# Patient Record
Sex: FEMALE | Race: WHITE | Employment: FULL TIME | URBAN - METROPOLITAN AREA
[De-identification: names, ages, dates, MRNs, and addresses within clinical notes are randomized per-mention and may not be internally consistent; named-entity substitution may affect disease eponyms.]

---

## 2021-01-26 ENCOUNTER — OFFICE VISIT (OUTPATIENT)
Dept: HEMATOLOGY | Age: 31
End: 2021-01-26
Payer: COMMERCIAL

## 2021-01-26 VITALS
TEMPERATURE: 97.3 F | HEIGHT: 65 IN | BODY MASS INDEX: 29.82 KG/M2 | HEART RATE: 78 BPM | OXYGEN SATURATION: 98 % | RESPIRATION RATE: 16 BRPM | SYSTOLIC BLOOD PRESSURE: 112 MMHG | WEIGHT: 179 LBS | DIASTOLIC BLOOD PRESSURE: 62 MMHG

## 2021-01-26 DIAGNOSIS — D13.4 HEPATIC ADENOMA: Primary | ICD-10-CM

## 2021-01-26 PROBLEM — Z90.49 STATUS POST LAPAROSCOPIC CHOLECYSTECTOMY: Status: ACTIVE | Noted: 2021-01-26

## 2021-01-26 PROBLEM — K76.9 HEPATIC LESION: Status: ACTIVE | Noted: 2021-01-26

## 2021-01-26 PROBLEM — E53.8 VITAMIN B12 DEFICIENCY: Status: ACTIVE | Noted: 2021-01-26

## 2021-01-26 PROBLEM — Z3A.12 12 WEEKS GESTATION OF PREGNANCY: Status: ACTIVE | Noted: 2021-01-26

## 2021-01-26 PROCEDURE — 99204 OFFICE O/P NEW MOD 45 MIN: CPT | Performed by: HOSPITALIST

## 2021-01-26 RX ORDER — CHOLECALCIFEROL TAB 125 MCG (5000 UNIT) 125 MCG
TAB ORAL DAILY
COMMUNITY

## 2021-01-26 RX ORDER — CYANOCOBALAMIN 1000 UG/ML
1000 INJECTION, SOLUTION INTRAMUSCULAR; SUBCUTANEOUS ONCE
COMMUNITY

## 2021-01-26 NOTE — PROGRESS NOTES
[address] [facility]      Blanca Serra MD, Jai Fleming, Ana Lora MD, MPH      Nahomy Morrison, JOHAN Angeles, [facility]-BC     Alyse Marin, [facility]   María Layton, FNP-SUN Justice, [facility]       Jan[facility] 136    at [address], [address] [place] [address], [facility] 22.    [phone]    FAX: [address] [place] [address]    [address], [address]    [facility], [address]    [phone]    FAX: [phone]     Patient Care Team:  Zeus Patel MD as PCP - General (Family Medicine)    Problem List  Date Reviewed: 1/26/2021          Codes Class Noted    Hepatic lesion ICD-10-CM: K76.9  ICD-9-CM: 573.8  1/26/2021        12 weeks gestation of pregnancy ICD-10-CM: Z3A.12  ICD-9-CM: V22.2  1/26/2021        Vitamin B12 deficiency ICD-10-CM: E53.8  ICD-9-CM: 266.2  1/26/2021        Status post laparoscopic cholecystectomy ICD-10-CM: Z90.49  ICD-9-CM: V45.89  1/26/2021            The clinicians listed above have asked me to see Rosea Mass in consultation regarding a liver mass. All medical records sent by the referring physicians were reviewed including imaging studies      The patient is a 26 y.o.  female without any history of previous liver disease. The patient underwent an ultrasound of the liver in 03/2020. This demonstrated normal liver but showed focal region of incrased echogenicity in right hepatic lobe measuring 3.2 x 1.9 x 1.8 cm suggestive of focal fat infiltration. Subsequent CT scan performed in 06/2020 showed a 1.6 x 1.3 cm hypodense lesion. MRI of the abdomen also performed in 06/2020 showed a 1.7 x 1.3 cm lesion in the anterior aspect of medial left hepatic lobe suggestive of adenoma.     Patient was on OCP for 14 to 15 years but she stopped in 08/2020 due to diagnosis of adenoma        A biopsy of the liver mass was not performed      The patient has no symptoms which can be attributed to the liver disorder. The patient is not currently experiencing the following symptoms of liver disease: fatigue, pain in the right side over the liver,   yellowing of the eyes or skin, problems concentrating, swelling of the abdomen, swelling of the lower extremities, hematemesis, hematochezia. The patient completes all daily activities without any functional limitations. ASSESSMENT AND PLAN:  Liver mass   This is most likely to be hepatic adenoma. Hepatic adenoma is a pre-malignant lesion. The risk of malignancy is increased only if the adenoma is greater than 5 cm. Adenomas are sensitive to estrogens and progestins. HRT or OCH should not be given to the patient or stopped if the patient is already taking these medications. An alternative form of contraception should be utilized. Stopping hormonal therapy will lead to the lesion shrinking in size. If the lesion reduces in size to less than 4 cm it can safely be monitored and does not need to be surgically resected. The adenoma is 1.7 x 1.3 cm in diameter,     The patient is asymptomatic   Patient is currently pregnant. We will repeat imaging of the liver after pregnancy to determine if adenoma is increasing in size. This will be performed in 1 year      Treatment of other medical problems in patients with chronic liver disease  There are no contraindications for the patient to take most medications that are necessary for treatment of other medical issues. Counseling for alcohol in patients with chronic liver disease  The patient was counseled regarding alcohol consumption and the effect of alcohol on chronic liver disease. Vaccinations   Since the patient does not have a chronic liver disease which can lead to liver injury screening for HAV and HBV is not needed.   Routine vaccinations against other bacterial and viral agents can be performed as indicated. Annual flu vaccination should be administered if indicated. ALLERGIES  No Known Allergies    MEDICATIONS  Current Outpatient Medications   Medication Sig    prenatal vit/iron fum/folic ac (PRENATAL 1+1 PO) Take  by mouth.  cholecalciferol (VITAMIN D3) (5000 Units/125 mcg) tab tablet Take  by mouth daily.  cyanocobalamin (VITAMIN B12) 1,000 mcg/mL injection 1,000 mcg by IntraMUSCular route once. monthly     No current facility-administered medications for this visit. SYSTEM REVIEW NOT RELATED TO LIVER DISEASE OR REVIEWED ABOVE:  Constitution systems: Negative for fever, chills, weight gain, weight loss. Eyes: Negative for visual changes. ENT: Negative for sore throat, painful swallowing. Respiratory: Negative for cough, hemoptysis, SOB. Cardiology: Negative for chest pain, palpitations. GI:  Negative for constipation or diarrhea. : Negative for urinary frequency, dysuria, hematuria, nocturia. Skin: Negative for rash. Hematology: Negative for easy bruising, blood clots. Musculo-skelatal: Negative for back pain, muscle pain, weakness. Neurologic: Negative for headaches, dizziness, vertigo, memory problems not related to HE. Psychology: Negative for anxiety, depression. FAMILY HISTORY:  The father Has/had the following following chronic disease(s): DM, HTN, fatty liver  The mother Has/had the following chronic disease(s): HTN, pre diabetes  There is no family history of liver disease. There is no family history of immune disorders. SOCIAL HISTORY:  The patient is .     Patient is pregnant    The patient stopped using tobacco products in 11/2020  The patient drinks alcohol rarely  The patient currently works full time as as a nurse      PHYSICAL EXAMINATION:  Visit Vitals  /62 (BP 1 Location: Right arm, BP Patient Position: Sitting)   Pulse 78   Temp 97.3 °F (36.3 °C) (Temporal)   Resp 16 Ht 5' 5\" (1.651 m)   Wt 179 lb (81.2 kg)   SpO2 98%   BMI 29.79 kg/m²     General: No acute distress. Eyes: Sclera anicteric. ENT: No oral lesions. Thyroid normal.  Nodes: No adenopathy. Skin: No spider angiomata. No jaundice. No palmar erythema. Respiratory: Lungs clear to auscultation. Cardiovascular: Regular heart rate. No murmurs. No JVD. Abdomen: Soft non-tender. Liver size normal to percussion/palpation. Spleen not palpable. No obvious ascites. Extremities: No edema. No muscle wasting. No gross arthritic changes. Neurologic: Alert and oriented. Cranial nerves grossly intact. No asterixis. LABORATORY STUDIES:  Recent liver function panel, CBC with platelet count and BMP are not available. These studies will be performed. 01/2020  ALT 85, AST 18, ALt 23, alb 3.9, Platelet 465    SEROLOGIES:  Not available or performed. Testing was performed today. LIVER HISTOLOGY:  Not available or performed    ENDOSCOPIC PROCEDURES:  Not available or performed    RADIOLOGY:  03/2020. Liver US: This demonstrated normal liver but showed focal region of incrased echogenicity in right hepatic lobe measuring 3.2 x 1.9 x 1.8 cm suggestive of focal fat infiltration. 06/2020: CT scan showed a 1.6 x 1.3 cm hypodense lesion. 06/2020: MRI of the abdomen: 1.7 x 1.3 cm lesion in the anterior aspect of medial left hepatic lobe suggestive of adenoma. OTHER TESTING:  Not available or performed    FOLLOW-UP:  All of the issues listed above in the Assessment and Plan were discussed with the patient. All questions were answered. The patient expressed a clear understanding of the above. Follow-up [facility] 32 in 1 year for routine monitoring.     Erika Galicia MD, MPH  Advanced Hepatology  [facility] of [address] [address] [address], [address] [facility] 22.  [phone]  [address] [facility] [place]

## 2021-01-26 NOTE — LETTER
1/29/2021    Patient: Merrick Coto   YOB: 1990   Date of Visit: 1/26/2021     Georgette Thomas MD  [id number] [id number]  Via Fax: [phone]    Dear Georgette Thomas MD,      Thank you for referring Ms. Merrick Coto to [facility] Bony Puckett for evaluation. My notes for this consultation are attached. If you have questions, please do not hesitate to call me. I look forward to following your patient along with you.       Sincerely,    Sariah Schmid MD

## 2021-01-26 NOTE — PROGRESS NOTES
Identified pt with two pt identifiers(name and ). Reviewed record in preparation for visit and have obtained necessary documentation. Chief Complaint   Patient presents with    New Patient     Establish care      Vitals:    21 1310   BP: 112/62   Pulse: 78   Resp: 16   Temp: 97.3 °F (36.3 °C)   TempSrc: Temporal   SpO2: 98%   Weight: 179 lb (81.2 kg)   Height: 5' 5\" (1.651 m)   PainSc:   0 - No pain       Health Maintenance Review: Patient reminded of \"due or due soon\" health maintenance. I have asked the patient to contact his/her primary care provider (PCP) for follow-up on his/her health maintenance. Coordination of Care Questionnaire:  :   1) Have you been to an emergency room, urgent care, or hospitalized since your last visit? If yes, where when, and reason for visit? no       2. Have seen or consulted any other health care provider since your last visit? If yes, where when, and reason for visit? YES, OB/GYN 21      Patient is accompanied by self I have received verbal consent from Dylan Estevez to discuss any/all medical information while they are present in the room.

## 2021-01-29 PROBLEM — D13.4 HEPATIC ADENOMA: Status: ACTIVE | Noted: 2021-01-29

## 2021-08-31 ENCOUNTER — OFFICE VISIT (OUTPATIENT)
Dept: HEMATOLOGY | Age: 31
End: 2021-08-31
Payer: COMMERCIAL

## 2021-08-31 VITALS
DIASTOLIC BLOOD PRESSURE: 76 MMHG | HEART RATE: 78 BPM | RESPIRATION RATE: 17 BRPM | WEIGHT: 206.2 LBS | SYSTOLIC BLOOD PRESSURE: 132 MMHG | BODY MASS INDEX: 34.35 KG/M2 | HEIGHT: 65 IN

## 2021-08-31 DIAGNOSIS — R74.8 ELEVATED LIVER ENZYMES: Primary | ICD-10-CM

## 2021-08-31 DIAGNOSIS — Z20.1 EXPOSURE TO TB: ICD-10-CM

## 2021-08-31 DIAGNOSIS — D13.4 HEPATIC ADENOMA: ICD-10-CM

## 2021-08-31 DIAGNOSIS — Z91.041 CONTRAST MEDIA ALLERGY: ICD-10-CM

## 2021-08-31 PROBLEM — Z3A.12 12 WEEKS GESTATION OF PREGNANCY: Status: RESOLVED | Noted: 2021-01-26 | Resolved: 2021-08-31

## 2021-08-31 PROCEDURE — 99214 OFFICE O/P EST MOD 30 MIN: CPT | Performed by: NURSE PRACTITIONER

## 2021-08-31 RX ORDER — METHYLPREDNISOLONE 32 MG/1
TABLET ORAL
Qty: 2 TABLET | Refills: 0 | Status: SHIPPED | OUTPATIENT
Start: 2021-08-31 | End: 2021-12-03 | Stop reason: SDUPTHER

## 2021-08-31 RX ORDER — ESCITALOPRAM OXALATE 20 MG/1
20 TABLET ORAL DAILY
COMMUNITY
Start: 2021-08-23

## 2021-08-31 NOTE — PROGRESS NOTES
Chief Complaint   Patient presents with   [facility] Follow Up     elevated LFT       Visit Vitals  /76 (BP 1 Location: Left upper arm, BP Patient Position: Sitting, BP Cuff Size: Adult)   Pulse 78   Resp 17   Ht 5' 5\" (1.651 m)   Wt 206 lb 3.2 oz (93.5 kg)   BMI 34.31 kg/m²       1. Have you been to the ER, urgent care clinic since your last visit? Hospitalized since your last visit? Daecon Pradhan July 18, 2021    2. Have you seen or consulted any other health care providers outside of the [address] [place] [address] since your last visit? Include any pap smears or colon screening.  No

## 2021-08-31 NOTE — PROGRESS NOTES
[address] [facility]      Veda Esposito MD, Shruthi Hanna, Snehal Andersen MD, MPH      Martín Russell, JOHAN Bee, [facility]-BC     Alyse Marin, [facility]   Annette Grace, FNP-C    Stephanie Rock, [facility]       Jan[facility] 136    at [address], [address] [place] [address], [facility] 22.    [phone]    FAX: [address] [place] [address] [address], [address]    [phone]    FAX: [phone]     Patient Care Team:  Ashley Yin MD as PCP - General (Family Medicine)  Ashley Yin MD as PCP - [facility]    Problem List  Date Reviewed: 1/29/2021        Codes Class Noted    Hepatic adenoma ICD-10-CM: D13.4  ICD-9-CM: 211.5  1/29/2021        Hepatic lesion ICD-10-CM: K76.9  ICD-9-CM: 573.8  1/26/2021        12 weeks gestation of pregnancy ICD-10-CM: Z3A.12  ICD-9-CM: V22.2  1/26/2021        Vitamin B12 deficiency ICD-10-CM: E53.8  ICD-9-CM: 266.2  1/26/2021        Status post laparoscopic cholecystectomy ICD-10-CM: Z90.49  ICD-9-CM: V45.89  1/26/2021              Gris Larson is being seen at [address] for management of elevated liver enzymes and hepatic adenoma. The active problem list, all pertinent past medical history, medications, radiologic findings and laboratory findings related to the liver disorder were reviewed and discussed with the patient. The patient is a 33 y.o.  female without any history of previous liver disease. The patient underwent an ultrasound of the liver in 3/2020. This demonstrated normal liver with a lesion in right hepatic lobe measuring 3.2 x 1.9 x 1.8 cm suggestive of focal fat infiltration.     CT of abdomen performed 6/2020 demonstrated a 1.6 x 1.3 cm hypodense lesion. MRI of the abdomen performed 6/2020 demonstrated a 1.7 x 1.3 cm lesion in the anterior aspect of medial left hepatic lobe suggestive of adenoma. Patient was on OCP for approximately 15 years which were discontinued 8/2020 due to diagnosis of adenoma. The patient then became pregnant. Since the last office visit the patient developed pre-eclampsia during pregnancy and the liver enzymes elevated. She had a normal delivery 7/12/2021 and the liver enzymes have not normalized. Of note, the patient had a TB exposure in 2009 while working at [employer]. She was treated with INH and Rifampin in 2009 and again in 2019. The liver enzymes elevated during treatment and she was unable to complete the course. Dr. Juan Manuel Mcnair was following and felt adequate treatment was received. The patient has no symptoms which can be attributed to the liver disorder. The patient is not currently experiencing the following symptoms of liver disease: fatigue, pain in the right side over the liver, yellowing of the eyes or skin, or swelling of the abdomen. The patient completes all daily activities without any functional limitations. ASSESSMENT AND PLAN:  Elevated liver enzymes  Persistent elevation in liver transaminases and alkaline phosphatase of unclear etiology at this time. Will perform laboratory testing to monitor liver function and degree of liver injury. This included   BMP, hepatic panel, AND CBC with platelet count. Liver function is normal.  The platelet count is normal.      Serologic testing for causes of chronic liver disease were ordered. The most likely causes for the liver chemistry abnormalities were discussed with the patient and include   fatty liver disease, or immune liver disorders. The need to perform an assessment of liver fibrosis was discussed with the patient.   The Fibroscan can assess liver fibrosis and determine if a patient has advanced fibrosis or cirrhosis without the need for liver biopsy. This will be performed at the next office visit. If the Fibroscan suggests advanced fibrosis then a liver biopsy should be considered. The Fibroscan can be repeated annually or as often as clinically indicated to assess for fibrosis progression and/or regression. Liver mass   MRI of liver 6/2020 demonstrated a 1.7 x 1.3 cm hypodense lesion that appeared to be favoring a hepatic adenoma. Hepatic adenoma is a pre-malignant lesion. The risk of malignancy is increased only if the adenoma is greater than 5 cm. Adenomas are sensitive to estrogens and progestins. HRT or OCH should not be given to the patient or stopped if the patient is already taking these medications. An alternative form of contraception should be utilized. Stopping hormonal therapy will lead to the lesion shrinking in size. If the lesion reduces in size to less than 4 cm it can safely be monitored and does not need to be surgically resected. The OCP was discontinued 8/2020 and the patient then became pregnant. She developed pre-eclampsia and delivered 7/12/2021. Will order Dynamic MRI to evaluate lesion. The patient expressed concern over a possible contrast allergy. Will order prophylaxis. AFP ordered today. Treatment of other medical problems in patients with chronic liver disease  There are no contraindications for the patient to take most medications that are necessary for treatment of other medical issues. Counseling for alcohol in patients with chronic liver disease  The patient was counseled regarding alcohol consumption and the effect of alcohol on chronic liver disease. Vaccinations   Since the patient does not have a chronic liver disease which can lead to liver injury screening for HAV and HBV is not needed. Routine vaccinations against other bacterial and viral agents can be performed as indicated.   Annual flu vaccination should be administered if indicated. ALLERGIES  No Known Allergies    MEDICATIONS  Current Outpatient Medications   Medication Sig    escitalopram oxalate (LEXAPRO) 20 mg tablet Take 20 mg by mouth daily.  prenatal vit/iron fum/folic ac (PRENATAL 1+1 PO) Take  by mouth.  cholecalciferol (VITAMIN D3) (5000 Units/125 mcg) tab tablet Take  by mouth daily.  cyanocobalamin (VITAMIN B12) 1,000 mcg/mL injection 1,000 mcg by IntraMUSCular route once. monthly     No current facility-administered medications for this visit. SYSTEM REVIEW NOT RELATED TO LIVER DISEASE OR REVIEWED ABOVE:  Constitution systems: Negative for fever, chills, weight gain, weight loss. Eyes: Negative for visual changes. ENT: Negative for sore throat, painful swallowing. Respiratory: Negative for cough, hemoptysis, SOB. Cardiology: Negative for chest pain, palpitations. GI:  Negative for constipation or diarrhea. : Negative for urinary frequency, dysuria, hematuria, nocturia. Skin: Negative for rash. Hematology: Negative for easy bruising, blood clots. Musculo-skeletal: Negative for back pain, muscle pain, weakness. Neurologic: Negative for headaches, dizziness, vertigo, memory problems not related to HE. Psychology: Negative for anxiety, depression. FAMILY HISTORY:  The father Has/had the following following chronic disease(s): DM, HTN, fatty liver  The mother Has/had the following chronic disease(s): HTN, pre diabetes  There is no family history of liver disease. There is no family history of immune disorders. SOCIAL HISTORY:  The patient is .     Patient is pregnant    The patient stopped using tobacco products in 11/2020  The patient drinks alcohol rarely  The patient currently works full time as as a nurse      PHYSICAL EXAMINATION:  Visit Vitals  /76 (BP 1 Location: Left upper arm, BP Patient Position: Sitting, BP Cuff Size: Adult)   Pulse 78   Resp 17   Ht 5' 5\" (1.651 m)   Wt 206 lb 3.2 oz (93.5 kg)   BMI 34.31 kg/m²     General: No acute distress. Eyes: Sclera anicteric. ENT: No oral lesions. Thyroid normal.  Nodes: No adenopathy. Skin: No spider angiomata. No jaundice. No palmar erythema. Respiratory: Lungs clear to auscultation. Cardiovascular: Regular heart rate. No murmurs. No JVD. Abdomen: Soft non-tender. Liver size normal to percussion/palpation. Spleen not palpable. No obvious ascites. Extremities: No edema. No muscle wasting. No gross arthritic changes. Neurologic: Alert and oriented. Cranial nerves grossly intact. No asterixis. LABORATORY STUDIES:  Liver [place] of [address] Units 8/31/2021   WBC 3.4 - 10.8 x10E3/uL 10.3   ANC 1.4 - 7.0 x10E3/uL 6.4   HGB 11.1 - 15.9 g/dL 13.2    - 450 x10E3/uL 417   AST 0 - 40 IU/L 47 (H)   ALT 0 - 32 IU/L 68 (H)   Alk Phos 48 - 121 IU/L 128 (H)   Bili, Total 0.0 - 1.2 mg/dL 0.4   Bili, Direct 0.00 - 0.40 mg/dL 0.12   Albumin 3.8 - 4.8 g/dL 4.4   BUN 6 - 20 mg/dL 10   Creat 0.57 - 1.00 mg/dL 0.78   Na 134 - 144 mmol/L 139   K 3.5 - 5.2 mmol/L 4.6   Cl 96 - 106 mmol/L 103   CO2 20 - 29 mmol/L 23   Glucose 65 - 99 mg/dL 83     From: 8/05/2021  AST/ALT/ALP/T Bili/ALB: 55/62/139/0.6/4.2    From: 7/21/2021  AST/ALT/ALP/T Bili/ALB:69/71/139/0.4/4.1  WBC/HB/PLT/INR:  NA/BUN/CREAT:    From: 7/18/2021  AST/ALT/ALP/T Bili/ALB:82/71/125/0.5/2.6  WBC/HB/PLT/INR:11.9/11.1/359  NA/BUN/CREAT:137/7/0.46    From: 7/11/2021  AST/ALT/ALP/T Bili/ALB:17/22/141/0.4/2.3    From: 1/2020  AST/ALT/ALP/ALB: 18/23/85/3.9  WBC/HB/PLT/INR:  NA/BUN/CREAT:    SEROLOGIES:  Serologies Latest Ref Rng & Units 8/31/2021   Hep A Ab, Total Negative Negative   Hep B Surface Ag Negative Negative   Hep B Core Ab, Total Negative Negative   Hep B Surface AB QL  Reactive   Hep C Ab 0.0 - 0.9 s/co ratio 0.1   Ferritin 15 - 150 ng/mL 12 (L)   Iron % Saturation 15 - 55 % 10 (L)   Alpha-1 antitrypsin level 100 - 188 mg/dL 122     Additional serologies are pending.      LIVER HISTOLOGY:  Not available or performed    ENDOSCOPIC PROCEDURES:  Not available or performed    RADIOLOGY:  3/2020. Liver US: This demonstrated normal liver but showed focal region of incrased echogenicity in right hepatic lobe measuring 3.2 x 1.9 x 1.8 cm suggestive of focal fat infiltration. 6/2020: CT scan showed a 1.6 x 1.3 cm hypodense lesion. 6/2020: MRI of the abdomen: 1.7 x 1.3 cm lesion in the anterior aspect of medial left hepatic lobe suggestive of adenoma. OTHER TESTING:  Not available or performed    FOLLOW-UP:  All of the issues listed above in the Assessment and Plan were discussed with the patient. All questions were answered. The patient expressed a clear understanding of the above. [address] [address] in 2 weeks for a Fibroscan. Imaging will be performed prior to the next office visit. ABBI SnyderNP-BC  [facility] of [address] [address] [address], [address] [place] [address] 22.  [address] [facility]

## 2021-09-01 LAB
A1AT SERPL-MCNC: 122 MG/DL (ref 100–188)
ACTIN IGG SERPL-ACNC: 5 UNITS (ref 0–19)
AFP L3 MFR SERPL: NORMAL % (ref 0–9.9)
AFP SERPL-MCNC: 2 NG/ML (ref 0–8)
ALBUMIN SERPL-MCNC: 4.4 G/DL (ref 3.8–4.8)
ALP SERPL-CCNC: 128 IU/L (ref 48–121)
ALT SERPL-CCNC: 68 IU/L (ref 0–32)
AST SERPL-CCNC: 47 IU/L (ref 0–40)
BASOPHILS # BLD AUTO: 0 X10E3/UL (ref 0–0.2)
BASOPHILS NFR BLD AUTO: 0 %
BILIRUB DIRECT SERPL-MCNC: 0.12 MG/DL (ref 0–0.4)
BILIRUB SERPL-MCNC: 0.4 MG/DL (ref 0–1.2)
BUN SERPL-MCNC: 10 MG/DL (ref 6–20)
BUN/CREAT SERPL: 13 (ref 9–23)
C-ANCA TITR SER IF: NORMAL TITER
CALCIUM SERPL-MCNC: 10.1 MG/DL (ref 8.7–10.2)
CHLORIDE SERPL-SCNC: 103 MMOL/L (ref 96–106)
CO2 SERPL-SCNC: 23 MMOL/L (ref 20–29)
CREAT SERPL-MCNC: 0.78 MG/DL (ref 0.57–1)
EOSINOPHIL # BLD AUTO: 0.1 X10E3/UL (ref 0–0.4)
EOSINOPHIL NFR BLD AUTO: 1 %
ERYTHROCYTE [DISTWIDTH] IN BLOOD BY AUTOMATED COUNT: 13.7 % (ref 11.7–15.4)
FERRITIN SERPL-MCNC: 12 NG/ML (ref 15–150)
GLUCOSE SERPL-MCNC: 83 MG/DL (ref 65–99)
HAV AB SER QL IA: NEGATIVE
HBV CORE AB SERPL QL IA: NEGATIVE
HBV SURFACE AB SER QL: REACTIVE
HBV SURFACE AG SERPL QL IA: NEGATIVE
HCT VFR BLD AUTO: 39.5 % (ref 34–46.6)
HCV AB S/CO SERPL IA: 0.1 S/CO RATIO (ref 0–0.9)
HCV AB SERPL QL IA: NORMAL
HGB BLD-MCNC: 13.2 G/DL (ref 11.1–15.9)
IMM GRANULOCYTES # BLD AUTO: 0 X10E3/UL (ref 0–0.1)
IMM GRANULOCYTES NFR BLD AUTO: 0 %
IRON SATN MFR SERPL: 10 % (ref 15–55)
IRON SERPL-MCNC: 42 UG/DL (ref 27–159)
LYMPHOCYTES # BLD AUTO: 3.2 X10E3/UL (ref 0.7–3.1)
LYMPHOCYTES NFR BLD AUTO: 31 %
MCH RBC QN AUTO: 31.2 PG (ref 26.6–33)
MCHC RBC AUTO-ENTMCNC: 33.4 G/DL (ref 31.5–35.7)
MCV RBC AUTO: 93 FL (ref 79–97)
MITOCHONDRIA M2 IGG SER-ACNC: <20 UNITS (ref 0–20)
MONOCYTES # BLD AUTO: 0.5 X10E3/UL (ref 0.1–0.9)
MONOCYTES NFR BLD AUTO: 5 %
NEUTROPHILS # BLD AUTO: 6.4 X10E3/UL (ref 1.4–7)
NEUTROPHILS NFR BLD AUTO: 63 %
P-ANCA ATYPICAL TITR SER IF: NORMAL TITER
P-ANCA TITR SER IF: NORMAL TITER
PLATELET # BLD AUTO: 417 X10E3/UL (ref 150–450)
POTASSIUM SERPL-SCNC: 4.6 MMOL/L (ref 3.5–5.2)
PROT SERPL-MCNC: 7.3 G/DL (ref 6–8.5)
RBC # BLD AUTO: 4.23 X10E6/UL (ref 3.77–5.28)
SODIUM SERPL-SCNC: 139 MMOL/L (ref 134–144)
TIBC SERPL-MCNC: 426 UG/DL (ref 250–450)
UIBC SERPL-MCNC: 384 UG/DL (ref 131–425)
WBC # BLD AUTO: 10.3 X10E3/UL (ref 3.4–10.8)

## 2021-09-02 LAB — ANA TITR SER IF: NEGATIVE {TITER}

## 2021-09-07 ENCOUNTER — HOSPITAL ENCOUNTER (OUTPATIENT)
Dept: MRI IMAGING | Age: 31
Discharge: HOME | End: 2021-09-07
Attending: NURSE PRACTITIONER
Payer: COMMERCIAL

## 2021-09-07 DIAGNOSIS — D13.4 HEPATIC ADENOMA: ICD-10-CM

## 2021-09-07 DIAGNOSIS — R74.8 ELEVATED LIVER ENZYMES: ICD-10-CM

## 2021-09-07 PROCEDURE — A9585 GADOBUTROL INJECTION: HCPCS | Performed by: NURSE PRACTITIONER

## 2021-09-07 PROCEDURE — 74183 MRI ABD W/O CNTR FLWD CNTR: CPT

## 2021-09-07 PROCEDURE — 74011250636 HC RX REV CODE- 250/636: Performed by: NURSE PRACTITIONER

## 2021-09-07 RX ADMIN — GADOBUTROL 9 ML: 604.72 INJECTION INTRAVENOUS at 13:27

## 2021-09-07 NOTE — PROGRESS NOTES
My chart message sent to the patient regarding the blood work results. The liver numbers remain elevated. Testing for viral, autoimmune, and hereditary causes of liver disease were negative. The iron studies were low suggesting she is iron deficient. MRI is scheduled for today. We will see her back for a Fibroscan and to determine treatment plan.

## 2021-09-08 ENCOUNTER — OFFICE VISIT (OUTPATIENT)
Dept: HEMATOLOGY | Age: 31
End: 2021-09-08
Payer: COMMERCIAL

## 2021-09-08 VITALS
OXYGEN SATURATION: 98 % | BODY MASS INDEX: 33.99 KG/M2 | SYSTOLIC BLOOD PRESSURE: 142 MMHG | RESPIRATION RATE: 16 BRPM | TEMPERATURE: 96.7 F | HEIGHT: 65 IN | WEIGHT: 204 LBS | DIASTOLIC BLOOD PRESSURE: 77 MMHG | HEART RATE: 77 BPM

## 2021-09-08 DIAGNOSIS — R74.8 ELEVATED LIVER ENZYMES: Primary | ICD-10-CM

## 2021-09-08 DIAGNOSIS — D13.4 HEPATIC ADENOMA: ICD-10-CM

## 2021-09-08 PROCEDURE — 99214 OFFICE O/P EST MOD 30 MIN: CPT | Performed by: NURSE PRACTITIONER

## 2021-09-08 PROCEDURE — 91200 LIVER ELASTOGRAPHY: CPT | Performed by: NURSE PRACTITIONER

## 2021-09-08 RX ORDER — ERGOCALCIFEROL 1.25 MG/1
CAPSULE ORAL
COMMUNITY
Start: 2021-08-15

## 2021-09-08 NOTE — PROGRESS NOTES
Identified pt with two pt identifiers(name and ). Reviewed record in preparation for visit and have obtained necessary documentation. Chief Complaint   Patient presents with    Elevated Liver Enzymes     Fibroscan f/u      Vitals:    21 1544   BP: (!) 142/77   Pulse: 77   Resp: 16   Temp: (!) 96.7 °F (35.9 °C)   TempSrc: Temporal   SpO2: 98%   Weight: 204 lb (92.5 kg)   Height: 5' 5\" (1.651 m)   PainSc:   2   PainLoc: Flank       Health Maintenance Review: Patient reminded of \"due or due soon\" health maintenance. I have asked the patient to contact his/her primary care provider (PCP) for follow-up on his/her health maintenance. Coordination of Care Questionnaire:  :   1) Have you been to an emergency room, urgent care, or hospitalized since your last visit? If yes, where when, and reason for visit? no       2. Have seen or consulted any other health care provider since your last visit? If yes, where when, and reason for visit? NO      Patient is accompanied by self I have received verbal consent from Zackary Calzada to discuss any/all medical information while they are present in the room.

## 2021-09-08 NOTE — PROGRESS NOTES
[address] [facility]      [place] MD, Jannet Ferraro, Steph Mcdowell MD, MPH      Alethea Altamirano, PAJAIMIE Baez, [facility]     Alyse Marin, [facility]   Torsten Hood, P-SUN Benjamin, [facility]       [facility] 136    at [address], [address] [place] [address], [facility] 22.    [phone]    FAX: [address] [place] [address] [address], [address]    [phone]    FAX: [phone]     Patient Care Team:  Molly Diaz MD as PCP - General (Family Medicine)  Molly Diaz MD as PCP - [facility] Provider    Problem List  Date Reviewed: 9/1/2021        Codes Class Noted    Exposure to TB ICD-10-CM: Z20.1  ICD-9-CM: V01.1  8/31/2021    Overview Signed 8/31/2021  3:08 PM by Alyse Peralta NP     Treated              Elevated liver enzymes ICD-10-CM: R74.8  ICD-9-CM: 790.5  8/31/2021        Hepatic adenoma ICD-10-CM: D13.4  ICD-9-CM: 211.5  1/29/2021        Hepatic lesion ICD-10-CM: K76.9  ICD-9-CM: 573.8  1/26/2021        Vitamin B12 deficiency ICD-10-CM: E53.8  ICD-9-CM: 266.2  1/26/2021        Status post laparoscopic cholecystectomy ICD-10-CM: Z90.49  ICD-9-CM: V45.89  1/26/2021              Severiano Werner [facility] for management of elevated liver enzymes and hepatic adenoma. The active problem list, all pertinent past medical history, medications, radiologic findings and laboratory findings related to the liver disorder were reviewed and discussed with the patient. The patient is a 33 y.o.  female without any history of previous liver disease. The patient underwent an ultrasound of the liver in 3/2020.  This demonstrated normal liver with a lesion in right hepatic lobe measuring 3.2 x 1.9 x 1.8 cm suggestive of focal fat infiltration. CT of abdomen performed 6/2020 demonstrated a 1.6 x 1.3 cm hypodense lesion. MRI of the abdomen performed 6/2020 demonstrated a 1.7 x 1.3 cm lesion in the anterior aspect of medial left hepatic lobe suggestive of adenoma. Patient was on OCP for approximately 15 years which were discontinued 8/2020 due to diagnosis of adenoma. The patient then became pregnant. The patient developed pre-eclampsia during pregnancy and the liver enzymes elevated. She had a normal delivery 7/12/2021 and the liver enzymes have not normalized. Of note, the patient had a TB exposure in 2009 while working at [employer]. She was treated with INH and Rifampin in 2009 and again in 2019. The liver enzymes elevated during treatment and she was unable to complete the course. Dr. Shona Foy was following and felt adequate treatment was received. She returns for Fibroscan today. The patient has no symptoms which can be attributed to the liver disorder. The patient is not currently experiencing the following symptoms of liver disease: fatigue, pain in the right side over the liver, yellowing of the eyes or skin, or swelling of the abdomen. The patient completes all daily activities without any functional limitations. ASSESSMENT AND PLAN:  Elevated liver enzymes  Persistent elevation in liver transaminases and alkaline phosphatase of unclear etiology at this time. Assessment of liver fibrosis was performed with Fibroscan in the office today. The result was 5.4 kPa which correlates with no fibrosis. The CAP score of 284 suggests hepatic steatosis. Have performed laboratory testing to monitor liver function and degree of liver injury. This included BMP, hepatic panel, and CBC with platelet count. Laboratory testing from 8/31/2021 reviewed in detail. Follow-up testing ordered today.     Liver function is normal.  The platelet count is normal.      Serologic testing for causes of chronic liver disease were negative. The most likely causes for the liver chemistry abnormalities were discussed with the patient and include   fatty liver disease. The Fibroscan can be repeated annually or as often as clinically indicated to assess for fibrosis progression and/or regression. Liver mass   MRI of liver 6/2020 demonstrated a 1.7 x 1.3 cm hypodense lesion that appeared to be favoring a hepatic adenoma. Hepatic adenoma is a pre-malignant lesion. The risk of malignancy is increased only if the adenoma is greater than 5 cm. Adenomas are sensitive to estrogens and progestins. HRT or OCH should not be given to the patient or stopped if the patient is already taking these medications. An alternative form of contraception should be utilized. Stopping hormonal therapy will lead to the lesion shrinking in size. If the lesion reduces in size to less than 4 cm it can safely be monitored and does not need to be surgically resected. The OCP was discontinued 8/2020 and the patient then became pregnant. She developed pre-eclampsia and delivered 7/12/2021. Dynamic MRI was completed today. Results are pending. AFP normal.     Treatment of other medical problems in patients with chronic liver disease  There are no contraindications for the patient to take most medications that are necessary for treatment of other medical issues. Counseling for alcohol in patients with chronic liver disease  The patient was counseled regarding alcohol consumption and the effect of alcohol on chronic liver disease. Vaccinations   Since the patient does not have a chronic liver disease which can lead to liver injury screening for HAV and HBV is not needed. Routine vaccinations against other bacterial and viral agents can be performed as indicated. Annual flu vaccination should be administered if indicated.     ALLERGIES  Allergies   Allergen Reactions    Contrast Agent [Iodine] Unknown (comments)       MEDICATIONS  Current Outpatient Medications   Medication Sig    ergocalciferol (ERGOCALCIFEROL) 1,250 mcg (50,000 unit) capsule     escitalopram oxalate (LEXAPRO) 20 mg tablet Take 20 mg by mouth daily.  methylPREDNISolone (MEDROL) 32 mg tablet Take 1 tab 12 hours prior to MRI repeat 2 hours prior    prenatal vit/iron fum/folic ac (PRENATAL 1+1 PO) Take  by mouth.  cholecalciferol (VITAMIN D3) (5000 Units/125 mcg) tab tablet Take  by mouth daily.  cyanocobalamin (VITAMIN B12) 1,000 mcg/mL injection 1,000 mcg by IntraMUSCular route once. monthly     No current facility-administered medications for this visit. SYSTEM REVIEW NOT RELATED TO LIVER DISEASE OR REVIEWED ABOVE:  Constitution systems: Negative for fever, chills, weight gain, weight loss. Eyes: Negative for visual changes. ENT: Negative for sore throat, painful swallowing. Respiratory: Negative for cough, hemoptysis, SOB. Cardiology: Negative for chest pain, palpitations. GI:  Negative for constipation or diarrhea. : Negative for urinary frequency, dysuria, hematuria, nocturia. Skin: Negative for rash. Hematology: Negative for easy bruising, blood clots. Musculo-skeletal: Negative for back pain, muscle pain, weakness. Neurologic: Negative for headaches, dizziness, vertigo, memory problems not related to HE. Psychology: Negative for anxiety, depression. FAMILY HISTORY:  The father Has/had the following following chronic disease(s): DM, HTN, fatty liver  The mother Has/had the following chronic disease(s): HTN, pre diabetes  There is no family history of liver disease. There is no family history of immune disorders. SOCIAL HISTORY:  The patient is .     Patient is pregnant    The patient stopped using tobacco products in 11/2020  The patient drinks alcohol rarely  The patient currently works full time as as a nurse      PHYSICAL EXAMINATION:  Visit Vitals  BP (!) 142/77 (BP 1 Location: Right upper arm, BP Patient Position: Sitting, BP Cuff Size: Large adult)   Pulse 77   Temp (!) 96.7 °F (35.9 °C) (Temporal)   Resp 16   Ht 5' 5\" (1.651 m)   Wt 204 lb (92.5 kg)   SpO2 98%   Breastfeeding Unknown   BMI 33.95 kg/m²     General: No acute distress. Eyes: Sclera anicteric. ENT: No oral lesions. Thyroid normal.  Nodes: No adenopathy. Skin: No spider angiomata. No jaundice. No palmar erythema. Respiratory: Lungs clear to auscultation. Cardiovascular: Regular heart rate. No murmurs. No JVD. Abdomen: Soft non-tender. Liver size normal to percussion/palpation. Spleen not palpable. No obvious ascites. Extremities: No edema. No muscle wasting. No gross arthritic changes. Neurologic: Alert and oriented. Cranial nerves grossly intact. No asterixis.     LABORATORY STUDIES:  Liver [place] of [address] Units 8/31/2021   WBC 3.4 - 10.8 x10E3/uL 10.3   ANC 1.4 - 7.0 x10E3/uL 6.4   HGB 11.1 - 15.9 g/dL 13.2    - 450 x10E3/uL 417   AST 0 - 40 IU/L 47 (H)   ALT 0 - 32 IU/L 68 (H)   Alk Phos 48 - 121 IU/L 128 (H)   Bili, Total 0.0 - 1.2 mg/dL 0.4   Bili, Direct 0.00 - 0.40 mg/dL 0.12   Albumin 3.8 - 4.8 g/dL 4.4   BUN 6 - 20 mg/dL 10   Creat 0.57 - 1.00 mg/dL 0.78   Na 134 - 144 mmol/L 139   K 3.5 - 5.2 mmol/L 4.6   Cl 96 - 106 mmol/L 103   CO2 20 - 29 mmol/L 23   Glucose 65 - 99 mg/dL 83     Cancer Screening Latest Ref Rng & Units 8/31/2021   AFP, Serum 0.0 - 8.0 ng/mL 2.0   AFP-L3% 0.0 - 9.9 % Comment     From: 8/05/2021  AST/ALT/ALP/T Bili/ALB: 55/62/139/0.6/4.2    From: 7/21/2021  AST/ALT/ALP/T Bili/ALB:69/71/139/0.4/4.1  WBC/HB/PLT/INR:  NA/BUN/CREAT:    From: 7/18/2021  AST/ALT/ALP/T Bili/ALB:82/71/125/0.5/2.6  WBC/HB/PLT/INR:11.9/11.1/359  NA/BUN/CREAT:137/7/0.46    From: 7/11/2021  AST/ALT/ALP/T Bili/ALB:17/22/141/0.4/2.3    From: 1/2020  AST/ALT/ALP/ALB: 18/23/85/3.9  WBC/HB/PLT/INR:  NA/BUN/CREAT:    SEROLOGIES:  Serologies Latest Ref Rng & Units 8/31/2021   Hep A Ab, Total Negative Negative   Hep B Surface Ag Negative Negative   Hep B Core Ab, Total Negative Negative   Hep B Surface AB QL  Reactive   Hep C Ab 0.0 - 0.9 s/co ratio 0.1   Ferritin 15 - 150 ng/mL 12 (L)   Iron % Saturation 15 - 55 % 10 (L)   ELICIA, IFA  Negative   C-ANCA Neg:<1:20 titer <1:20   P-ANCA Neg:<1:20 titer <1:20   ANCA Neg:<1:20 titer <1:20   ASMCA 0 - 19 Units 5   M2 Ab 0.0 - 20.0 Units <20.0   Alpha-1 antitrypsin level 100 - 188 mg/dL 122     LIVER HISTOLOGY:  9/2021. FibroScan performed at [address]. EkPa was 5.4. IQR/med 28%. . The results suggested a fibrosis level of F0. The CAP score suggests there is hepatic steatosis. ENDOSCOPIC PROCEDURES:  Not available or performed    RADIOLOGY:  3/2020. Liver US: This demonstrated normal liver but showed focal region of incrased echogenicity in right hepatic lobe measuring 3.2 x 1.9 x 1.8 cm suggestive of focal fat infiltration. 6/2020: CT scan showed a 1.6 x 1.3 cm hypodense lesion. 6/2020: MRI of the abdomen: 1.7 x 1.3 cm lesion in the anterior aspect of medial left hepatic lobe suggestive of adenoma. 9/2021. Dynamic MRI pending     OTHER TESTING:  Not available or performed    FOLLOW-UP:  All of the issues listed above in the Assessment and Plan were discussed with the patient. All questions were answered. The patient expressed a clear understanding of the above. [address] [facility] 87 in 3 months. April ADRIAN Mejias-BC  [address] [address] [address]io Ha[address], [address] [facility], [facility] 22.  [address] [facility]

## 2021-09-15 NOTE — PROGRESS NOTES
My chart message sent to the patient regarding the MRI. There were no previous images to compare. There is a questionable 1.5 cm lesion. The size of the previously thought to be adenoma was 1.6 cm.  We will see her back in 3 months and repeat the MRI in 6 months with [facility].

## 2021-09-17 DIAGNOSIS — R16.0 LIVER MASS: Primary | ICD-10-CM

## 2021-09-20 ENCOUNTER — TELEPHONE (OUTPATIENT)
Dept: HEMATOLOGY | Age: 31
End: 2021-09-20

## 2021-12-03 DIAGNOSIS — Z91.041 CONTRAST MEDIA ALLERGY: ICD-10-CM

## 2021-12-03 RX ORDER — METHYLPREDNISOLONE 32 MG/1
TABLET ORAL
Qty: 2 TABLET | Refills: 0 | Status: SHIPPED | OUTPATIENT
Start: 2021-12-03

## 2021-12-07 ENCOUNTER — HOSPITAL ENCOUNTER (OUTPATIENT)
Dept: MRI IMAGING | Age: 31
Discharge: HOME | End: 2021-12-07
Attending: NURSE PRACTITIONER
Payer: COMMERCIAL

## 2021-12-07 DIAGNOSIS — R16.0 LIVER MASS: ICD-10-CM

## 2021-12-07 PROCEDURE — A9581 GADOXETATE DISODIUM INJ: HCPCS | Performed by: NURSE PRACTITIONER

## 2021-12-07 PROCEDURE — 74011250636 HC RX REV CODE- 250/636: Performed by: NURSE PRACTITIONER

## 2021-12-07 PROCEDURE — 74183 MRI ABD W/O CNTR FLWD CNTR: CPT

## 2021-12-07 RX ADMIN — GADOXETATE DISODIUM 10 ML: 181.43 INJECTION, SOLUTION INTRAVENOUS at 09:12

## 2021-12-27 NOTE — PROGRESS NOTES
My chart message sent to the patient regarding the MRI. Also discussed with Dr. Karely Goetz. Will discuss options with patient at the next office visit.

## 2021-12-28 ENCOUNTER — PATIENT MESSAGE (OUTPATIENT)
Dept: HEMATOLOGY | Age: 31
End: 2021-12-28

## 2022-01-28 ENCOUNTER — OFFICE VISIT (OUTPATIENT)
Dept: HEMATOLOGY | Age: 32
End: 2022-01-28
Payer: COMMERCIAL

## 2022-01-28 VITALS
WEIGHT: 212 LBS | BODY MASS INDEX: 35.32 KG/M2 | TEMPERATURE: 98.6 F | HEIGHT: 65 IN | RESPIRATION RATE: 16 BRPM | DIASTOLIC BLOOD PRESSURE: 75 MMHG | OXYGEN SATURATION: 99 % | SYSTOLIC BLOOD PRESSURE: 125 MMHG | HEART RATE: 77 BPM

## 2022-01-28 DIAGNOSIS — R74.8 ELEVATED LIVER ENZYMES: Primary | ICD-10-CM

## 2022-01-28 DIAGNOSIS — R16.0 LIVER MASS: ICD-10-CM

## 2022-01-28 PROCEDURE — 99213 OFFICE O/P EST LOW 20 MIN: CPT | Performed by: NURSE PRACTITIONER

## 2022-01-28 RX ORDER — LORAZEPAM 0.5 MG/1
0.5 TABLET ORAL AS NEEDED
COMMUNITY

## 2022-01-28 NOTE — PROGRESS NOTES
Identified pt with two pt identifiers(name and ). Reviewed record in preparation for visit and have obtained necessary documentation. Chief Complaint   Patient presents with    Elevated Liver Enzymes     f/u      Vitals:    22 1022   BP: 125/75   Pulse: 77   Resp: 16   Temp: 98.6 °F (37 °C)   TempSrc: Temporal   SpO2: 99%   Weight: 212 lb (96.2 kg)   Height: 5' 5\" (1.651 m)   PainSc:   0 - No pain   LMP: 2021       Health Maintenance Review: Patient reminded of \"due or due soon\" health maintenance. I have asked the patient to contact his/her primary care provider (PCP) for follow-up on his/her health maintenance. Coordination of Care Questionnaire:  :   1) Have you been to an emergency room, urgent care, or hospitalized since your last visit? If yes, where when, and reason for visit? no       2. Have seen or consulted any other health care provider since your last visit? If yes, where when, and reason for visit? NO      Patient is accompanied by self I have received verbal consent from Amanda Anna to discuss any/all medical information while they are present in the room.

## 2022-01-28 NOTE — PROGRESS NOTES
[address] [facility]      Magaly Conte MD, Marlene Scott, Ashanti Garcia MD, MPH      Donta Ill, PA-C    Jeri Martinez, ACNP-BC     Alyse Marin, [facility]   Gordon Bello FNP-SUN Seaman, [facility]       Janet Porras [facility] 136    at [address], [address] [place] [address], [facility] 22.    [phone]    FAX: [address] [place] [address], [address]    [phone]    FAX: [phone]     Patient Care Team:  Yves Nicole MD as PCP - General (Family Medicine)  Yves Nicole MD as PCP - [address] [place] [address] Provider  Gia Abdi MD (Endocrinology)  Emeli Hdez MD (Obstetrics & Gynecology)    Problem List  Date Reviewed: 1/28/2022          Codes Class Noted    Exposure to TB ICD-10-CM: Z20.1  ICD-9-CM: V01.1  8/31/2021    Overview Signed 8/31/2021  3:08 PM by Alyse Treviño NP     Treated              Elevated liver enzymes ICD-10-CM: R74.8  ICD-9-CM: 790.5  8/31/2021        Hepatic adenoma ICD-10-CM: D13.4  ICD-9-CM: 211.5  1/29/2021        Hepatic lesion ICD-10-CM: K76.9  ICD-9-CM: 573.8  1/26/2021        Vitamin B12 deficiency ICD-10-CM: E53.8  ICD-9-CM: 266.2  1/26/2021        Status post laparoscopic cholecystectomy ICD-10-CM: Z90.49  ICD-9-CM: V45.89  1/26/2021              Emmanuel Ngo is being [facility] at [facility] for management of elevated liver enzymes and hepatic adenoma. The active problem list, all pertinent past medical history, medications, radiologic findings and laboratory findings related to the liver disorder were reviewed and discussed with the patient. The patient is a 33 y.o.  female without any history of previous liver disease.       The patient underwent an ultrasound of the liver in 3/2020. This demonstrated normal liver with a lesion in right hepatic lobe measuring 3.2 x 1.9 x 1.8 cm suggestive of focal fat infiltration. CT of abdomen performed 6/2020 demonstrated a 1.6 x 1.3 cm hypodense lesion. MRI of the abdomen performed 6/2020 demonstrated a 1.7 x 1.3 cm lesion in the anterior aspect of medial left hepatic lobe suggestive of adenoma. Patient was on OCP for approximately 15 years which were discontinued 8/2020 due to diagnosis of adenoma. The patient then became pregnant. The patient developed pre-eclampsia during pregnancy and the liver enzymes elevated. She had a normal delivery 7/12/2021 and the liver enzymes have not normalized. Of note, the patient had a TB exposure in 2009 while working at [employer]. She was treated with INH and Rifampin in 2009 and again in 2019. The liver enzymes elevated during treatment and she was unable to complete the course. Dr. Jhonathan Wasserman was following and felt adequate treatment was received. Assessment of liver fibrosis was performed with Fibroscan 9/2021. . The result was 5.4 kPa which correlates with no fibrosis. The CAP score of 284 suggests hepatic steatosis. The patient has no symptoms which can be attributed to the liver disorder. The patient is not currently experiencing the following symptoms of liver disease: fatigue, pain in the right side over the liver, yellowing of the eyes or skin, or swelling of the abdomen. The patient completes all daily activities without any functional limitations. ASSESSMENT AND PLAN:  Elevated liver enzymes  Persistent elevation in liver transaminases and alkaline phosphatase of unclear etiology at this time. Assessment of liver fibrosis was performed with Fibroscan 9/2021. The result was 5.4 kPa which correlates with no fibrosis. The CAP score of 284 suggests hepatic steatosis. Have performed laboratory testing to monitor liver function and degree of liver injury.  This included BMP, hepatic panel, and CBC with platelet count. Laboratory testing from 8/31/2021 reviewed in detail. Follow-up testing ordered today. The ALP is elevated. The liver function is normal. The platelet count is normal.   Serologic testing for causes of chronic liver disease were negative. The most likely causes for the liver chemistry abnormalities were discussed with the patient and include fatty liver disease. The Fibroscan can be repeated annually or as often as clinically indicated to assess for fibrosis progression and/or regression. Liver mass   MRI of liver 6/2020 demonstrated a 1.7 x 1.3 cm hypodense lesion that appeared to be favoring a hepatic adenoma. Hepatic adenoma is a pre-malignant lesion. The risk of malignancy is increased only if the adenoma is greater than 5 cm. Adenomas are sensitive to estrogens and progestins. HRT or OCH should not be given to the patient or stopped if the patient is already taking these medications. An alternative form of contraception should be utilized. Stopping hormonal therapy will lead to the lesion shrinking in size. If the lesion reduces in size to less than 4 cm it can safely be monitored and does not need to be surgically resected. The OCP was discontinued 8/2020 and the patient then became pregnant. She developed pre-eclampsia and delivered 7/12/2021. The Dynamic MRI was completed with [employer] 12/07/2021. No hepatic lesion. Previously questioned lesion may have been artifact or if an adenoma may have resolved. Advised she can restart birth control if she wishes to do so. We will schedule her for a MRI in 4 months and continue to follow. Treatment of other medical problems in patients with chronic liver disease  There are no contraindications for the patient to take most medications that are necessary for treatment of other medical issues.     Counseling for alcohol in patients with chronic liver disease  The patient was counseled regarding alcohol consumption and the effect of alcohol on chronic liver disease. Vaccinations   Since the patient does not have a chronic liver disease which can lead to liver injury screening for HAV and HBV is not needed. Routine vaccinations against other bacterial and viral agents can be performed as indicated. Annual flu vaccination should be administered if indicated. ALLERGIES  Allergies   Allergen Reactions    Contrast Agent [Iodine] Nausea Only       MEDICATIONS  Current Outpatient Medications   Medication Sig    LORazepam (Ativan) 0.5 mg tablet Take 0.5 mg by mouth as needed for Anxiety.  escitalopram oxalate (LEXAPRO) 20 mg tablet Take 20 mg by mouth daily.  cholecalciferol (VITAMIN D3) (5000 Units/125 mcg) tab tablet Take  by mouth daily.  cyanocobalamin (VITAMIN B12) 1,000 mcg/mL injection 1,000 mcg by IntraMUSCular route once. monthly    methylPREDNISolone (MEDROL) 32 mg tablet Take 1 tab 12 hours prior to MRI repeat 2 hours prior (Patient not taking: Reported on 1/28/2022)    ergocalciferol (ERGOCALCIFEROL) 1,250 mcg (50,000 unit) capsule  (Patient not taking: Reported on 1/28/2022)    prenatal vit/iron fum/folic ac (PRENATAL 1+1 PO) Take  by mouth. (Patient not taking: Reported on 1/28/2022)     No current facility-administered medications for this visit. SYSTEM REVIEW NOT RELATED TO LIVER DISEASE OR REVIEWED ABOVE:  Constitution systems: Negative for fever, chills, weight gain, weight loss. Eyes: Negative for visual changes. ENT: Negative for sore throat, painful swallowing. Respiratory: Negative for cough, hemoptysis, SOB. Cardiology: Negative for chest pain, palpitations. GI:  Negative for constipation or diarrhea. : Negative for urinary frequency, dysuria, hematuria, nocturia. Skin: Negative for rash. Hematology: Negative for easy bruising, blood clots. Musculo-skeletal: Negative for back pain, muscle pain, weakness.   Neurologic: Negative for headaches, dizziness, vertigo, memory problems not related to HE. Psychology: Negative for anxiety, depression. FAMILY HISTORY:  The father Has/had the following following chronic disease(s): DM, HTN, fatty liver  The mother Has/had the following chronic disease(s): HTN, pre diabetes  There is no family history of liver disease. There is no family history of immune disorders. SOCIAL HISTORY:  The patient is . Patient is pregnant    The patient stopped using tobacco products in 11/2020  The patient drinks alcohol rarely  The patient currently works full time as as a nurse    PHYSICAL EXAMINATION:  Visit Vitals  /75 (BP 1 Location: Right upper arm, BP Patient Position: Sitting, BP Cuff Size: Large adult)   Pulse 77   Temp 98.6 °F (37 °C) (Temporal)   Resp 16   Ht 5' 5\" (1.651 m)   Wt 212 lb (96.2 kg)   LMP 12/31/2021 (Approximate)   SpO2 99%   BMI 35.28 kg/m²       General: No acute distress. Eyes: Sclera anicteric. ENT: No oral lesions. Thyroid normal.  Nodes: No adenopathy. Skin: No spider angiomata. No jaundice. No palmar erythema. Respiratory: Lungs clear to auscultation. Cardiovascular: Regular heart rate. No murmurs. No JVD. Abdomen: Soft non-tender, liver size normal to percussion/palpation. Spleen not palpable. No obvious ascites. Extremities: No edema. No muscle wasting. No gross arthritic changes. Neurologic: Alert and oriented. Cranial nerves grossly intact. No asterixis.     LABORATORY STUDIES:  Liver [place] of [address] Units 8/31/2021   WBC 3.4 - 10.8 x10E3/uL 10.3   ANC 1.4 - 7.0 x10E3/uL 6.4   HGB 11.1 - 15.9 g/dL 13.2    - 450 x10E3/uL 417   AST 0 - 40 IU/L 47 (H)   ALT 0 - 32 IU/L 68 (H)   Alk Phos 48 - 121 IU/L 128 (H)   Bili, Total 0.0 - 1.2 mg/dL 0.4   Bili, Direct 0.00 - 0.40 mg/dL 0.12   Albumin 3.8 - 4.8 g/dL 4.4   BUN 6 - 20 mg/dL 10   Creat 0.57 - 1.00 mg/dL 0.78   Na 134 - 144 mmol/L 139   K 3.5 - 5.2 mmol/L 4.6   Cl 96 - 106 mmol/L 103   CO2 20 - 29 mmol/L 23   Glucose 65 - 99 mg/dL 83     Cancer Screening Latest Ref Rng & Units 8/31/2021   AFP, Serum 0.0 - 8.0 ng/mL 2.0   AFP-L3% 0.0 - 9.9 % Comment     From: 8/05/2021  AST/ALT/ALP/T Bili/ALB: 55/62/139/0.6/4.2    From: 7/21/2021  AST/ALT/ALP/T Bili/ALB:69/71/139/0.4/4.1  WBC/HB/PLT/INR:  NA/BUN/CREAT:    Laboratory testing from 8/31/2021 reviewed in detail. Additional testing included to evaluate progression or regression of disease. Laboratory testing results from today will be communicated by My Chart. SEROLOGIES:  Serologies Latest Ref Rng & Units 8/31/2021   Hep A Ab, Total Negative Negative   Hep B Surface Ag Negative Negative   Hep B Core Ab, Total Negative Negative   Hep B Surface AB QL  Reactive   Hep C Ab 0.0 - 0.9 s/co ratio 0.1   Ferritin 15 - 150 ng/mL 12 (L)   Iron % Saturation 15 - 55 % 10 (L)   ELICIA, IFA  Negative   C-ANCA Neg:<1:20 titer <1:20   P-ANCA Neg:<1:20 titer <1:20   ANCA Neg:<1:20 titer <1:20   ASMCA 0 - 19 Units 5   M2 Ab 0.0 - 20.0 Units <20.0   Alpha-1 antitrypsin level 100 - 188 mg/dL 122     LIVER HISTOLOGY:  9/2021. FibroScan performed at [facility]. EkPa was 5.4. IQR/med 28%. . The results suggested a fibrosis level of F0. The CAP score suggests there is hepatic steatosis. ENDOSCOPIC PROCEDURES:  Not available or performed    RADIOLOGY:  3/2020. Liver US: This demonstrated normal liver but showed focal region of incrased echogenicity in right hepatic lobe measuring 3.2 x 1.9 x 1.8 cm suggestive of focal fat infiltration. 6/2020: CT scan showed a 1.6 x 1.3 cm hypodense lesion. 6/2020: MRI of the abdomen: 1.7 x 1.3 cm lesion in the anterior aspect of medial left hepatic lobe suggestive of adenoma. 9/2021. Dynamic MRI of liver. There is a 1.5 cm peripherally arterial enhancing lesion in segment 4A. No stones or ductal dilatation. 12/2021. Dynamic MRI of liver. No hepatic lesion.  Previous lesion in question may have been due to artifact or a resolved adenoma. Steatosis. No stones or ductal dilatation. OTHER TESTING:  Not available or performed    FOLLOW-UP:  All of the issues listed above in the Assessment and Plan were discussed with the patient. All questions were answered. The patient expressed a clear understanding of the above. [address] [id number] in 4 months. Will repeat MRI in 4 months prior to the next appointment. SAMARIA SnyderPCNP-BC  [facility] 13  [address] [facility] 77 [address] [address], [address] [place] [address] 22.  [address] [facility]

## 2022-01-29 LAB
ALBUMIN SERPL-MCNC: 4.3 G/DL (ref 3.8–4.8)
ALP SERPL-CCNC: 118 IU/L (ref 44–121)
ALT SERPL-CCNC: 43 IU/L (ref 0–32)
AST SERPL-CCNC: 31 IU/L (ref 0–40)
BASOPHILS # BLD AUTO: 0.1 X10E3/UL (ref 0–0.2)
BASOPHILS NFR BLD AUTO: 1 %
BILIRUB DIRECT SERPL-MCNC: 0.12 MG/DL (ref 0–0.4)
BILIRUB SERPL-MCNC: 0.5 MG/DL (ref 0–1.2)
BUN SERPL-MCNC: 9 MG/DL (ref 6–20)
BUN/CREAT SERPL: 12 (ref 9–23)
CALCIUM SERPL-MCNC: 9.4 MG/DL (ref 8.7–10.2)
CHLORIDE SERPL-SCNC: 104 MMOL/L (ref 96–106)
CO2 SERPL-SCNC: 22 MMOL/L (ref 20–29)
CREAT SERPL-MCNC: 0.74 MG/DL (ref 0.57–1)
EOSINOPHIL # BLD AUTO: 0.1 X10E3/UL (ref 0–0.4)
EOSINOPHIL NFR BLD AUTO: 1 %
ERYTHROCYTE [DISTWIDTH] IN BLOOD BY AUTOMATED COUNT: 14.3 % (ref 11.7–15.4)
GLUCOSE SERPL-MCNC: 82 MG/DL (ref 65–99)
HCT VFR BLD AUTO: 42.3 % (ref 34–46.6)
HGB BLD-MCNC: 14 G/DL (ref 11.1–15.9)
IMM GRANULOCYTES # BLD AUTO: 0 X10E3/UL (ref 0–0.1)
IMM GRANULOCYTES NFR BLD AUTO: 0 %
LYMPHOCYTES # BLD AUTO: 3.4 X10E3/UL (ref 0.7–3.1)
LYMPHOCYTES NFR BLD AUTO: 26 %
MCH RBC QN AUTO: 31.4 PG (ref 26.6–33)
MCHC RBC AUTO-ENTMCNC: 33.1 G/DL (ref 31.5–35.7)
MCV RBC AUTO: 95 FL (ref 79–97)
MONOCYTES # BLD AUTO: 0.6 X10E3/UL (ref 0.1–0.9)
MONOCYTES NFR BLD AUTO: 5 %
NEUTROPHILS # BLD AUTO: 8.8 X10E3/UL (ref 1.4–7)
NEUTROPHILS NFR BLD AUTO: 67 %
PLATELET # BLD AUTO: 374 X10E3/UL (ref 150–450)
POTASSIUM SERPL-SCNC: 4.5 MMOL/L (ref 3.5–5.2)
PROT SERPL-MCNC: 6.8 G/DL (ref 6–8.5)
RBC # BLD AUTO: 4.46 X10E6/UL (ref 3.77–5.28)
SODIUM SERPL-SCNC: 138 MMOL/L (ref 134–144)
WBC # BLD AUTO: 13 X10E3/UL (ref 3.4–10.8)

## 2022-02-13 NOTE — PROGRESS NOTES
Letter sent to the patient regarding the blood work results.  The liver enzymes have improved and are now close to normal.

## 2022-03-18 PROBLEM — R74.8 ELEVATED LIVER ENZYMES: Status: ACTIVE | Noted: 2021-08-31

## 2022-03-18 PROBLEM — K76.9 HEPATIC LESION: Status: ACTIVE | Noted: 2021-01-26

## 2022-03-19 PROBLEM — D13.4 HEPATIC ADENOMA: Status: ACTIVE | Noted: 2021-01-29

## 2022-03-19 PROBLEM — Z90.49 STATUS POST LAPAROSCOPIC CHOLECYSTECTOMY: Status: ACTIVE | Noted: 2021-01-26

## 2022-03-20 PROBLEM — E53.8 VITAMIN B12 DEFICIENCY: Status: ACTIVE | Noted: 2021-01-26

## 2022-03-20 PROBLEM — Z20.1 EXPOSURE TO TB: Status: ACTIVE | Noted: 2021-08-31
